# Patient Record
Sex: MALE | Race: BLACK OR AFRICAN AMERICAN | Employment: FULL TIME | URBAN - METROPOLITAN AREA
[De-identification: names, ages, dates, MRNs, and addresses within clinical notes are randomized per-mention and may not be internally consistent; named-entity substitution may affect disease eponyms.]

---

## 2021-11-05 ENCOUNTER — APPOINTMENT (OUTPATIENT)
Dept: GENERAL RADIOLOGY | Age: 26
DRG: 914 | End: 2021-11-05
Payer: COMMERCIAL

## 2021-11-05 ENCOUNTER — HOSPITAL ENCOUNTER (INPATIENT)
Age: 26
LOS: 1 days | Discharge: HOME | DRG: 914 | End: 2021-11-06
Attending: EMERGENCY MEDICINE | Admitting: STUDENT IN AN ORGANIZED HEALTH CARE EDUCATION/TRAINING PROGRAM
Payer: COMMERCIAL

## 2021-11-05 ENCOUNTER — APPOINTMENT (OUTPATIENT)
Dept: CT IMAGING | Age: 26
DRG: 914 | End: 2021-11-05
Payer: COMMERCIAL

## 2021-11-05 DIAGNOSIS — R07.89 CHEST WALL PAIN: ICD-10-CM
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
## 2021-11-05 LAB
B.E.: 3.5 MMOL/L (ref -3–3)
COHB: 1.5 % (ref 0–1.5)
CRITICAL: ABNORMAL
DATE ANALYZED: ABNORMAL
DATE OF COLLECTION: ABNORMAL
HCO3: 24.7 MMOL/L (ref 22–26)
HCT VFR BLD CALC: 41 % (ref 37–54)
HEMOGLOBIN: 14.5 G/DL (ref 12.5–16.5)
HHB: 0.5 % (ref 0–5)
LAB: ABNORMAL
Lab: ABNORMAL
MCH RBC QN AUTO: 28.9 PG (ref 26–35)
MCHC RBC AUTO-ENTMCNC: 35.4 % (ref 32–34.5)
MCV RBC AUTO: 81.8 FL (ref 80–99.9)
METHB: 0.4 % (ref 0–1.5)
MODE: ABNORMAL
O2 CONTENT: 22.5 ML/DL
O2 SATURATION: 99.5 % (ref 92–98.5)
O2HB: 97.6 % (ref 94–97)
OPERATOR ID: 1632
PATIENT TEMP: 37 C
PCO2: 28.9 MMHG (ref 35–45)
PDW BLD-RTO: 12.6 FL (ref 11.5–15)
PH BLOOD GAS: 7.55 (ref 7.35–7.45)
PLATELET # BLD: 174 E9/L (ref 130–450)
PMV BLD AUTO: 11.6 FL (ref 7–12)
PO2: 426.6 MMHG (ref 75–100)
POTASSIUM SERPL-SCNC: 3.28 MMOL/L (ref 3.5–5)
RBC # BLD: 5.01 E12/L (ref 3.8–5.8)
SOURCE, BLOOD GAS: ABNORMAL
THB: 15.6 G/DL (ref 11.5–16.5)
TIME ANALYZED: 2325
WBC # BLD: 6.9 E9/L (ref 4.5–11.5)

## 2021-11-05 PROCEDURE — 83605 ASSAY OF LACTIC ACID: CPT

## 2021-11-05 PROCEDURE — 72170 X-RAY EXAM OF PELVIS: CPT

## 2021-11-05 PROCEDURE — 82077 ASSAY SPEC XCP UR&BREATH IA: CPT

## 2021-11-05 PROCEDURE — 80179 DRUG ASSAY SALICYLATE: CPT

## 2021-11-05 PROCEDURE — 99284 EMERGENCY DEPT VISIT MOD MDM: CPT

## 2021-11-05 PROCEDURE — 80143 DRUG ASSAY ACETAMINOPHEN: CPT

## 2021-11-05 PROCEDURE — 72125 CT NECK SPINE W/O DYE: CPT

## 2021-11-05 PROCEDURE — 82805 BLOOD GASES W/O2 SATURATION: CPT

## 2021-11-05 PROCEDURE — 36415 COLL VENOUS BLD VENIPUNCTURE: CPT

## 2021-11-05 PROCEDURE — 71260 CT THORAX DX C+: CPT

## 2021-11-05 PROCEDURE — 70450 CT HEAD/BRAIN W/O DYE: CPT

## 2021-11-05 PROCEDURE — 85027 COMPLETE CBC AUTOMATED: CPT

## 2021-11-05 PROCEDURE — 80307 DRUG TEST PRSMV CHEM ANLYZR: CPT

## 2021-11-05 PROCEDURE — 74177 CT ABD & PELVIS W/CONTRAST: CPT

## 2021-11-05 PROCEDURE — 71045 X-RAY EXAM CHEST 1 VIEW: CPT

## 2021-11-05 PROCEDURE — 84132 ASSAY OF SERUM POTASSIUM: CPT

## 2021-11-05 PROCEDURE — 72128 CT CHEST SPINE W/O DYE: CPT

## 2021-11-05 PROCEDURE — 80053 COMPREHEN METABOLIC PANEL: CPT

## 2021-11-05 PROCEDURE — 86901 BLOOD TYPING SEROLOGIC RH(D): CPT

## 2021-11-05 PROCEDURE — 86850 RBC ANTIBODY SCREEN: CPT

## 2021-11-05 PROCEDURE — 86900 BLOOD TYPING SEROLOGIC ABO: CPT

## 2021-11-05 PROCEDURE — 72131 CT LUMBAR SPINE W/O DYE: CPT

## 2021-11-05 PROCEDURE — 6810039000 HC L1 TRAUMA ALERT

## 2021-11-05 NOTE — Clinical Note
Patient Class: Inpatient [101]   REQUIRED: Diagnosis: Trauma [124800]   Estimated Length of Stay: Estimated stay of more than 2 midnights

## 2021-11-05 NOTE — LETTER
[address] [facility] Emergency Department  [address]  [address] [address]  Phone: [phone]               November 6, 2021    Patient: Venessa Benjamin   YOB: 1995   Date of Visit: 11/5/2021       To Whom It May Concern:    Venessa Benjamin was seen and treated in our emergency department on 11/5/2021.  He can return to work/school when cleared by trauma doctor      Sincerely,       Vladimir Mejia RN         Signature:__________________________________

## 2021-11-06 VITALS
TEMPERATURE: 98.1 F | OXYGEN SATURATION: 96 % | HEART RATE: 74 BPM | RESPIRATION RATE: 18 BRPM | SYSTOLIC BLOOD PRESSURE: 142 MMHG | DIASTOLIC BLOOD PRESSURE: 80 MMHG

## 2021-11-06 PROBLEM — T14.90XA TRAUMA: Status: ACTIVE | Noted: 2021-11-06

## 2021-11-06 LAB
ABO/RH: NORMAL
ACETAMINOPHEN LEVEL: <5 MCG/ML (ref 10–30)
ALBUMIN SERPL-MCNC: 4.9 G/DL (ref 3.5–5.2)
ALP BLD-CCNC: 79 U/L (ref 40–129)
ALT SERPL-CCNC: 17 U/L (ref 0–40)
ANION GAP SERPL CALCULATED.3IONS-SCNC: 14 MMOL/L (ref 7–16)
ANION GAP SERPL CALCULATED.3IONS-SCNC: 15 MMOL/L (ref 7–16)
ANTIBODY SCREEN: NORMAL
AST SERPL-CCNC: 30 U/L (ref 0–39)
BILIRUB SERPL-MCNC: 0.7 MG/DL (ref 0–1.2)
BUN BLDV-MCNC: 7 MG/DL (ref 6–20)
BUN BLDV-MCNC: 7 MG/DL (ref 6–20)
CALCIUM SERPL-MCNC: 10 MG/DL (ref 8.6–10.2)
CALCIUM SERPL-MCNC: 9.9 MG/DL (ref 8.6–10.2)
CHLORIDE BLD-SCNC: 100 MMOL/L (ref 98–107)
CHLORIDE BLD-SCNC: 101 MMOL/L (ref 98–107)
CO2: 22 MMOL/L (ref 22–29)
CO2: 22 MMOL/L (ref 22–29)
CREAT SERPL-MCNC: 1 MG/DL (ref 0.7–1.2)
CREAT SERPL-MCNC: 1 MG/DL (ref 0.7–1.2)
ETHANOL: <10 MG/DL (ref 0–0.08)
GFR AFRICAN AMERICAN: >60
GFR AFRICAN AMERICAN: >60
GFR NON-AFRICAN AMERICAN: >60 ML/MIN/1.73
GFR NON-AFRICAN AMERICAN: >60 ML/MIN/1.73
GLUCOSE BLD-MCNC: 103 MG/DL (ref 74–99)
GLUCOSE BLD-MCNC: 107 MG/DL (ref 74–99)
HCT VFR BLD CALC: 43 % (ref 37–54)
HEMOGLOBIN: 14.6 G/DL (ref 12.5–16.5)
LACTIC ACID: 2.1 MMOL/L (ref 0.5–2.2)
MCH RBC QN AUTO: 29.1 PG (ref 26–35)
MCHC RBC AUTO-ENTMCNC: 34 % (ref 32–34.5)
MCV RBC AUTO: 85.7 FL (ref 80–99.9)
PDW BLD-RTO: 12.6 FL (ref 11.5–15)
PLATELET # BLD: 181 E9/L (ref 130–450)
PMV BLD AUTO: 11.5 FL (ref 7–12)
POTASSIUM REFLEX MAGNESIUM: 3.8 MMOL/L (ref 3.5–5)
POTASSIUM SERPL-SCNC: 4.1 MMOL/L (ref 3.5–5)
RBC # BLD: 5.02 E12/L (ref 3.8–5.8)
SALICYLATE, SERUM: <0.3 MG/DL (ref 0–30)
SODIUM BLD-SCNC: 136 MMOL/L (ref 132–146)
SODIUM BLD-SCNC: 138 MMOL/L (ref 132–146)
TOTAL PROTEIN: 7.7 G/DL (ref 6.4–8.3)
TRICYCLIC ANTIDEPRESSANTS SCREEN SERUM: NEGATIVE NG/ML
WBC # BLD: 9 E9/L (ref 4.5–11.5)

## 2021-11-06 PROCEDURE — 6370000000 HC RX 637 (ALT 250 FOR IP)

## 2021-11-06 PROCEDURE — 6360000002 HC RX W HCPCS

## 2021-11-06 PROCEDURE — 6360000004 HC RX CONTRAST MEDICATION: Performed by: RADIOLOGY

## 2021-11-06 PROCEDURE — 2580000003 HC RX 258: Performed by: STUDENT IN AN ORGANIZED HEALTH CARE EDUCATION/TRAINING PROGRAM

## 2021-11-06 PROCEDURE — 1200000000 HC SEMI PRIVATE

## 2021-11-06 PROCEDURE — 96374 THER/PROPH/DIAG INJ IV PUSH: CPT

## 2021-11-06 PROCEDURE — G0378 HOSPITAL OBSERVATION PER HR: HCPCS

## 2021-11-06 PROCEDURE — 85027 COMPLETE CBC AUTOMATED: CPT

## 2021-11-06 PROCEDURE — 80048 BASIC METABOLIC PNL TOTAL CA: CPT

## 2021-11-06 RX ORDER — ONDANSETRON 2 MG/ML
4 INJECTION INTRAMUSCULAR; INTRAVENOUS EVERY 6 HOURS PRN
Status: DISCONTINUED | OUTPATIENT
Start: 2021-11-06 | End: 2021-11-06 | Stop reason: HOSPADM

## 2021-11-06 RX ORDER — SODIUM CHLORIDE 0.9 % (FLUSH) 0.9 %
10 SYRINGE (ML) INJECTION PRN
Status: DISCONTINUED | OUTPATIENT
Start: 2021-11-06 | End: 2021-11-06 | Stop reason: HOSPADM

## 2021-11-06 RX ORDER — POLYETHYLENE GLYCOL 3350 17 G/17G
17 POWDER, FOR SOLUTION ORAL DAILY
Status: DISCONTINUED | OUTPATIENT
Start: 2021-11-06 | End: 2021-11-06 | Stop reason: HOSPADM

## 2021-11-06 RX ORDER — SODIUM PHOSPHATE, DIBASIC AND SODIUM PHOSPHATE, MONOBASIC 7; 19 G/133ML; G/133ML
1 ENEMA RECTAL DAILY PRN
Status: DISCONTINUED | OUTPATIENT
Start: 2021-11-06 | End: 2021-11-06 | Stop reason: HOSPADM

## 2021-11-06 RX ORDER — SODIUM CHLORIDE 9 MG/ML
25 INJECTION, SOLUTION INTRAVENOUS PRN
Status: DISCONTINUED | OUTPATIENT
Start: 2021-11-06 | End: 2021-11-06 | Stop reason: HOSPADM

## 2021-11-06 RX ORDER — ACETAMINOPHEN 325 MG/1
650 TABLET ORAL EVERY 4 HOURS PRN
Status: DISCONTINUED | OUTPATIENT
Start: 2021-11-06 | End: 2021-11-06 | Stop reason: HOSPADM

## 2021-11-06 RX ORDER — SODIUM CHLORIDE, SODIUM LACTATE, POTASSIUM CHLORIDE, CALCIUM CHLORIDE 600; 310; 30; 20 MG/100ML; MG/100ML; MG/100ML; MG/100ML
INJECTION, SOLUTION INTRAVENOUS CONTINUOUS
Status: DISCONTINUED | OUTPATIENT
Start: 2021-11-06 | End: 2021-11-06 | Stop reason: HOSPADM

## 2021-11-06 RX ORDER — ONDANSETRON 2 MG/ML
INJECTION INTRAMUSCULAR; INTRAVENOUS
Status: COMPLETED
Start: 2021-11-06 | End: 2021-11-06

## 2021-11-06 RX ORDER — ACETAMINOPHEN 325 MG/1
TABLET ORAL
Status: COMPLETED
Start: 2021-11-06 | End: 2021-11-06

## 2021-11-06 RX ORDER — SODIUM CHLORIDE 0.9 % (FLUSH) 0.9 %
10 SYRINGE (ML) INJECTION EVERY 12 HOURS SCHEDULED
Status: DISCONTINUED | OUTPATIENT
Start: 2021-11-06 | End: 2021-11-06 | Stop reason: HOSPADM

## 2021-11-06 RX ADMIN — SODIUM CHLORIDE, POTASSIUM CHLORIDE, SODIUM LACTATE AND CALCIUM CHLORIDE: 600; 310; 30; 20 INJECTION, SOLUTION INTRAVENOUS at 06:00

## 2021-11-06 RX ADMIN — ONDANSETRON HYDROCHLORIDE 4 MG: 2 SOLUTION INTRAMUSCULAR; INTRAVENOUS at 01:57

## 2021-11-06 RX ADMIN — ACETAMINOPHEN 650 MG: 325 TABLET ORAL at 01:48

## 2021-11-06 RX ADMIN — ONDANSETRON 4 MG: 2 INJECTION INTRAMUSCULAR; INTRAVENOUS at 01:57

## 2021-11-06 RX ADMIN — IOPAMIDOL 90 ML: 755 INJECTION, SOLUTION INTRAVENOUS at 00:03

## 2021-11-06 ASSESSMENT — PAIN SCALES - GENERAL: PAINLEVEL_OUTOF10: 6
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
## 2021-11-06 NOTE — ED NOTES
Pt log rolled at this time. Spinal neutrality maintained. Pt c/o l-spine tenderness and hip tenderness. No stepoffs or deformities noted.      Leo Durbin RN  11/05/21 6302
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
## 2021-11-06 NOTE — ED NOTES
Pt is now alert and oriented. Moving all extremities. Pt is asking for fan. States he feels nauseous. Pt c/o chest and hip pain.       Beth Wyman RN  11/06/21 2131
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
## 2021-11-06 NOTE — ED NOTES
labwork from right groin by Dr Tr Ac    Pt c/o right side hip pain/right side chest pain     Alexandra Matthew RN  11/05/21 3613

## 2021-11-06 NOTE — ED NOTES
Pt allegedly had seizure like activity, some time pta.   Was given 8mg morphine by EMS pta     Seema Casillas RN  11/05/21 6185
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
## 2021-11-06 NOTE — DISCHARGE SUMMARY
SYSTEM PROVIDED HISTORY: HEAD TRAUMA, CLOSED, MILD, GCS >= 13, NO RISK FACTORS, NEURO EXAM NORMAL TECHNOLOGIST PROVIDED HISTORY: Reason for exam:->trauma Has a \"code stroke\" or \"stroke alert\" been called? ->No Decision Support Exception - unselect if not a suspected or confirmed emergency medical condition->Emergency Medical Condition (MA) What reading provider will be dictating this exam?->CRC FINDINGS: BRAIN/VENTRICLES: There is no acute intracranial hemorrhage, mass effect or midline shift. No abnormal extra-axial fluid collection. The gray-white differentiation is maintained without evidence of an acute infarct. There is no evidence of hydrocephalus. ORBITS: The visualized portion of the orbits demonstrate no acute abnormality. SINUSES: Moderate inflammatory changes in the paranasal sinuses asymmetric to the right with diffuse areas of mucosal thickening and scattered air-fluid levels with bubbles of gas. Mastoid air cells are clear. SOFT TISSUES/SKULL:  No acute abnormality of the visualized skull or soft tissues. No acute intracranial abnormality. Moderate inflammatory changes of the paranasal sinuses greater towards the right. CT CHEST W CONTRAST    Result Date: 11/6/2021  EXAMINATION: CT OF THE CHEST WITH CONTRAST 11/5/2021 11:39 pm TECHNIQUE: CT of the chest was performed with the administration of intravenous contrast. Multiplanar reformatted images are provided for review. Dose modulation, iterative reconstruction, and/or weight based adjustment of the mA/kV was utilized to reduce the radiation dose to as low as reasonably achievable. COMPARISON: None.  HISTORY: ORDERING SYSTEM PROVIDED HISTORY: trauma TECHNOLOGIST PROVIDED HISTORY: Reason for exam:->trauma Decision Support Exception - unselect if not a suspected or confirmed emergency medical condition->Emergency Medical Condition (MA) What reading provider will be dictating this exam?->CRC FINDINGS: Heart size is normal.  No pleural or performed without the administration of intravenous contrast. Multiplanar reformatted images are provided for review. Dose modulation, iterative reconstruction, and/or weight based adjustment of the mA/kV was utilized to reduce the radiation dose to as low as reasonably achievable. COMPARISON: None. HISTORY: ORDERING SYSTEM PROVIDED HISTORY: trauma TECHNOLOGIST PROVIDED HISTORY: Reason for exam:->trauma What reading provider will be dictating this exam?->CRC FINDINGS: The reformatted images are somewhat degraded by artifact. Allowing for this, no compression deformity or subluxation. Incomplete bony fusion of the posterior elements of T2 consistent with anatomic variation. Presumed bone island in the right-side of T6. No distinct acute fracture identified. No focal fluid collection. Minimal patchy dependent atelectasis in the left greater than right lung base. No acute bony abnormality. MRI would be useful if symptoms persist.     CT LUMBAR SPINE WO CONTRAST    Result Date: 11/6/2021  EXAMINATION: CT OF THE ABDOMEN AND PELVIS WITH CONTRAST; CT OF THE LUMBAR SPINE WITHOUT CONTRAST 11/5/2021 10:39 pm TECHNIQUE: CT of the abdomen and pelvis was performed with the administration of intravenous contrast. Multiplanar reformatted images are provided for review. Dose modulation, iterative reconstruction, and/or weight based adjustment of the mA/kV was utilized to reduce the radiation dose to as low as reasonably achievable.; CT of the lumbar spine was performed without the administration of intravenous contrast. Multiplanar reformatted images are provided for review. Adjustment of mA and/or kV according to patient size was utilized. Dose modulation, iterative reconstruction, and/or weight based adjustment of the mA/kV was utilized to reduce the radiation dose to as low as reasonably achievable. COMPARISON: None.  HISTORY: ORDERING SYSTEM PROVIDED HISTORY: trrauma TECHNOLOGIST PROVIDED HISTORY: Additional SPINE WITHOUT CONTRAST 11/5/2021 10:39 pm TECHNIQUE: CT of the abdomen and pelvis was performed with the administration of intravenous contrast. Multiplanar reformatted images are provided for review. Dose modulation, iterative reconstruction, and/or weight based adjustment of the mA/kV was utilized to reduce the radiation dose to as low as reasonably achievable.; CT of the lumbar spine was performed without the administration of intravenous contrast. Multiplanar reformatted images are provided for review. Adjustment of mA and/or kV according to patient size was utilized. Dose modulation, iterative reconstruction, and/or weight based adjustment of the mA/kV was utilized to reduce the radiation dose to as low as reasonably achievable. COMPARISON: None. HISTORY: ORDERING SYSTEM PROVIDED HISTORY: trrauma TECHNOLOGIST PROVIDED HISTORY: Additional Contrast?->None Reason for exam:->trrauma What reading provider will be dictating this exam?->CRC FINDINGS: Lower Chest: Atelectatic changes in the lung bases bilaterally. Organs: The liver, spleen, adrenals, pancreas and gallbladder are within normal limits. Kidneys within normal limits. No evidence of visceral injury. GI/Bowel: No free intraperitoneal air. No mesenteric or intramural hematoma. There is trace fluid in the pelvis, abnormal for a male. Trace hemoperitoneum must be suspected in a setting of trauma. Subtle mesenteric vessel or bowel injury cannot be excluded and should be considered. . Clinical correlation and surgical consultation should be considered. Pelvis: Thickening and stranding in the right inguinal region surrounding the right common femoral and proximal superficial femoral vessels suggest soft tissue contusion and may be related to direct trauma or prior instrumentation. Clinical correlation recommended. Peritoneum/Retroperitoneum: No evidence of retroperitoneal hematoma. Bones/Soft Tissues: No acute pelvic or hip fracture.  No evidence of acute lumbar fracture. No paraspinal hematoma. Trace fluid in the pelvis, abnormal for a male, and in a setting of trauma, suspicious for small volume hemoperitoneum. This may indicate subtle mesenteric vessel or bowel injury which must be considered. Clinical correlation and surgical consultation should be considered. Thickening and stranding in the right inguinal region surrounding the right common femoral and proximal superficial femoral vessels suggest soft tissue contusion and may be related to direct trauma or prior instrumentation. Clinical correlation recommended No acute lumbar fracture. Findings suspicious for small volume hemoperitoneum communicated to Dr. Abhishek Weiner at 1:03 a.m.     XR CHEST PORTABLE    Result Date: 11/5/2021  EXAMINATION: ONE XRAY VIEW OF THE CHEST 11/5/2021 11:48 pm COMPARISON: None. HISTORY: ORDERING SYSTEM PROVIDED HISTORY: trauma TECHNOLOGIST PROVIDED HISTORY: Reason for exam:->trauma What reading provider will be dictating this exam?->CRC FINDINGS: EKG leads overlie the chest.  Radiopaque necklace overlies the upper chest and base of neck. Heart size is normal.  No pneumothorax, focal consolidation or effusion. No acute fracture identified on this single projection. No acute cardiopulmonary findings. PA and lateral views would be useful for further assessment, if symptoms persist.       Discharge Exam:  General: No apparent distress, has complaints of nausea  HEENT: Trachea midline, no masses, Pupils equal round   Chest: Respiratory effort was normal with no retractions or use of accessory muscles. RA, [id number]  Cardiovascular: Extremities warm, well perfused,   Abdomen:  Soft and non distended. + tenderness in lower abdomen due 2/2 to seatbelt, guarding, rebound, or rigidity   Extremities: Moves all 4 extremities, No pedal edema     Disposition: home    In process/preliminary results:  Outstanding Order Results     No orders found for last 30 day(s).           Patient Instructions: There are no discharge medications for this patient. TRAUMA SERVICES DISCHARGE INSTRUCTIONS     Call [phone], option 2, for any questions/concerns and for follow-up appointment in 2 week(s).     Please follow the instructions checked below:     Please follow-up with your primary care provider.     ACTIVITY INSTRUCTIONS  Increase activity as tolerated  No heavy lifting or strenuous activity  Take your incentive spirometer home and use 4-6 times/day   [x]? No driving until cleared by trauma surgery        MEDICATION INSTRUCTIONS  Take medication as prescribed. When taking pain medications, you may experience dizziness or drowsiness. Do not drink alcohol or drive when taking these medications. You may experience constipation while taking pain medication. You may take over the counter stool softeners such as docusate (Colace), sennosides S (Senokot-S), or Miralax. [x]? You may take Ibuprofen (over the counter) as directed for mild pain. --You may take up to 800mg every 8 hours for pain, please take with food or milk. [x]? You may take acetaminophen (Tylenol) products. Do NOT take more than 4000mg of Tylenol in 24h. []? Do not take any other acetaminophen (Tylenol) products if you are taking Percocet or Norco, as these contain Tylenol. --Do NOT take more than 4000mg of Tylenol in 24h.    CALL 911 OR YOUR LOCAL EMERGENCY SERVICE:  --If you take too much medication  --If you have trouble breathing or shortness of breath  --A child has taken this medication.     WORK:  You may not return to work until you receive follow-up with the [address] [place] [address] Ave or clearance by all consultants.     Call the trauma clinic for any of the following or for questions/concerns;  --fever over 101F  --redness, swelling, hardness or warmth at the wound site(s).   --Unrelieved nausea/vomiting  --Foul smelling or cloudy drainage at the wound site(s)  --Unrelieved pain or increase in pain  --Increase in shortness of breath     Follow-up:  Trauma Clinic: [phone] option [address]  [address] [address], [address] [place]       Follow up:   No follow-up provider specified.      Signed:  Karen Hutson DO, PhD  11/6/2021  8:02 AM